# Patient Record
Sex: FEMALE | Race: WHITE | Employment: FULL TIME | URBAN - METROPOLITAN AREA
[De-identification: names, ages, dates, MRNs, and addresses within clinical notes are randomized per-mention and may not be internally consistent; named-entity substitution may affect disease eponyms.]

---

## 2022-02-02 ENCOUNTER — OFFICE VISIT (OUTPATIENT)
Dept: ENDOCRINOLOGY | Age: 57
End: 2022-02-02
Payer: COMMERCIAL

## 2022-02-02 VITALS
WEIGHT: 138 LBS | HEIGHT: 67 IN | SYSTOLIC BLOOD PRESSURE: 107 MMHG | BODY MASS INDEX: 21.66 KG/M2 | DIASTOLIC BLOOD PRESSURE: 66 MMHG | HEART RATE: 102 BPM

## 2022-02-02 DIAGNOSIS — E03.9 ACQUIRED HYPOTHYROIDISM: Primary | ICD-10-CM

## 2022-02-02 DIAGNOSIS — R73.03 PRE-DIABETES: ICD-10-CM

## 2022-02-02 PROCEDURE — 99214 OFFICE O/P EST MOD 30 MIN: CPT | Performed by: INTERNAL MEDICINE

## 2022-02-02 RX ORDER — CEPHALEXIN 500 MG/1
500 CAPSULE ORAL 3 TIMES DAILY
COMMUNITY
Start: 2022-01-01 | End: 2022-02-02 | Stop reason: ALTCHOICE

## 2022-02-02 RX ORDER — LISDEXAMFETAMINE DIMESYLATE 50 MG/1
CAPSULE ORAL
COMMUNITY
Start: 2022-02-01

## 2022-02-02 RX ORDER — LEVOTHYROXINE SODIUM 100 UG/1
TABLET ORAL
COMMUNITY
Start: 2021-12-23

## 2022-02-02 RX ORDER — DEXTROAMPHETAMINE SACCHARATE, AMPHETAMINE ASPARTATE, DEXTROAMPHETAMINE SULFATE AND AMPHETAMINE SULFATE 1.25; 1.25; 1.25; 1.25 MG/1; MG/1; MG/1; MG/1
TABLET ORAL AS NEEDED
COMMUNITY
Start: 2021-11-19 | End: 2022-03-28

## 2022-02-02 RX ORDER — SITAGLIPTIN AND METFORMIN HYDROCHLORIDE 50; 1000 MG/1; MG/1
TABLET, FILM COATED, EXTENDED RELEASE ORAL
COMMUNITY
Start: 2021-12-18

## 2022-02-02 RX ORDER — MONTELUKAST SODIUM 10 MG/1
TABLET ORAL
COMMUNITY
Start: 2022-02-01 | End: 2022-03-28

## 2022-02-02 RX ORDER — BUPROPION HYDROCHLORIDE 150 MG/1
TABLET ORAL
COMMUNITY
Start: 2022-02-01 | End: 2022-02-02 | Stop reason: ALTCHOICE

## 2022-02-02 RX ORDER — LISDEXAMFETAMINE DIMESYLATE 30 MG/1
CAPSULE ORAL
COMMUNITY
Start: 2021-12-15 | End: 2022-02-02 | Stop reason: ALTCHOICE

## 2022-02-02 RX ORDER — BUPROPION HYDROCHLORIDE 300 MG/1
300 TABLET ORAL DAILY
COMMUNITY
Start: 2022-01-08

## 2022-02-02 RX ORDER — MELOXICAM 15 MG/1
TABLET ORAL
COMMUNITY
Start: 2022-01-05 | End: 2022-03-28

## 2022-02-02 NOTE — PROGRESS NOTES
Chief Complaint   Patient presents with    Diabetes       * New Patient Visit - last seen by me at my old practice 5/21    General:  Spent last summer in [address] [place] [address],[address]- dad suffering from dementia  [employer] is getting very stressful - to change jobs or retiring  Needing Violeta Lyon - psych  -also deplin  Keeps falling down stairs - 5 times in 4 years--- before fall has sense of dizziness. Feels like narcolepsy; has overwhelming need to sleep at times; last time early Jan and ankle still swollen  To see Neuro in a few weeks - high incidence of alzheimer -- wants a baseline    1. Thyroid - on 100mcg, on brand, takes correctly  (she looked into T3 as an option awhile back but decided against it)    2. Pre-Diabetes - a1c 5.7 so got GTT and had lows. On janumet XR (stomach issues if 2 at once) now b/c ER version of metformin in it and tolerable  Does not check sugars - can feel is sugars go up    3.  Osteopenia - followed by PCP - 2nd work up was negative - likely genetic    Thyroid Symptoms  denies change in energy, denies change in weight, denies change in appetite, denies sleep issues, denies hair changes, no skin changes, denies sweats, denies hot/cold intolerance, denies tremor, denies palpitations, denies change in bowels, no change in menses, denies mood changes    Neck Symptoms  denies dysphagia, denies anterior neck pain, denies neck swelling, notes no nodules    Labs/Studies  4/24/19 a1c 5.4, TSH 1.5, Vit D 54  10/29/19 a1c 5.2, TSh 1.4, Vit D 45  5/1/20 celiac neg, SPEP/UPEP nl, Ca 10.0, PTH 17, Phos 3.2, Untx 19, a1c 5.0, Camacho 6.9, TSH 2.0, U Ca 10.1, U Cr 36, Vit D 38   12/9/20 a1c 5.2, TSH 3.1 (dose increased 88 to 100mcg)  5/3/21 a1c 5.1, TSH 1.7, Vit D 56  12/15/21 CMP wnl, CBC wnl, TSH 1.3, FT4 1.3, Vit D 42, a1c 5.1, chol 201/50/97/94    Past Medical History:   Diagnosis Date    B12 deficiency     Hypothyroidism     Lichen sclerosus of vulva     Nausea & vomiting     if placed on narcotics    Pre-diabetes     Ventricular septal defect     patent foramen ovale    Ventricular septal defect        Social History     Tobacco Use    Smoking status: Never Smoker    Smokeless tobacco: Never Used   Substance Use Topics    Alcohol use: No        Professor at [employer]    Blood pressure 107/66, pulse (!) 102, height 5' 7\" (1.702 m), weight 138 lb (62.6 kg), last menstrual period 03/20/2014. Weight Metrics 2/2/2022 6/10/2015 3/21/2014 5/6/2013 1/21/2013 12/20/2011 5/24/2011   Weight 138 lb 145 lb 148 lb 148 lb 147 lb 147 lb 9.6 oz 145 lb   BMI 21.61 kg/m2 22.71 kg/m2 23.17 kg/m2 23.17 kg/m2 23.02 kg/m2 23.11 kg/m2 22.71 kg/m2        EXAM:  - GENERAL: NCAT, Appears well nourished   - EYES: EOMI, non-icteric, no proptosis   - Ear/Nose/Throat: NCAT, no visible inflammation or masses   - CARDIOVASCULAR: no cyanosis, no visible JVD   - RESPIRATORY: respiratory effort normal without any distress or labored breathing   - MUSCULOSKELETAL: Normal ROM of neck and upper extremities observed   - SKIN: No rash on face  - NEUROLOGIC:  No facial asymmetry (Cranial nerve 7 motor function), No gaze palsy   - PSYCHIATRIC: Normal affect, Normal insight and judgement    Assessment/Plan:   1. Acquired hypothyroidism  stable on 100mcg- on brand, takes correctly    2. Pre-diabetes  Stable on janumet - uses this b/c can only tolerate branded metformin       Follow-up and Dispositions    · Return in about 1 year (around 2/2/2023).

## 2022-02-02 NOTE — LETTER
2/2/2022    Patient: Susanne Ball   YOB: 1965   Date of Visit: 2/2/2022     Suzanne Black MD  [facility]  [address] [facility] [address]  Via Fax: [phone]    Dear Suzanne Black MD,      Thank you for referring Ms. Cleopatra Garrison to [facility] for evaluation. My notes for this consultation are attached. If you have questions, please do not hesitate to call me. I look forward to following your patient along with you.       Sincerely,    Giacomo Ruiz MD

## 2022-03-28 ENCOUNTER — OFFICE VISIT (OUTPATIENT)
Dept: NEUROLOGY | Age: 57
End: 2022-03-28
Payer: COMMERCIAL

## 2022-03-28 VITALS
OXYGEN SATURATION: 99 % | HEART RATE: 82 BPM | SYSTOLIC BLOOD PRESSURE: 118 MMHG | BODY MASS INDEX: 22.02 KG/M2 | WEIGHT: 137 LBS | HEIGHT: 66 IN | DIASTOLIC BLOOD PRESSURE: 74 MMHG

## 2022-03-28 DIAGNOSIS — E53.8 LOW VITAMIN B12 LEVEL: ICD-10-CM

## 2022-03-28 DIAGNOSIS — R41.3 MEMORY LOSS: Primary | ICD-10-CM

## 2022-03-28 PROCEDURE — 99203 OFFICE O/P NEW LOW 30 MIN: CPT | Performed by: PSYCHIATRY & NEUROLOGY

## 2022-03-28 RX ORDER — BACLOFEN 10 MG/1
TABLET ORAL
COMMUNITY
Start: 2022-02-03 | End: 2022-03-28

## 2022-03-28 RX ORDER — HYDROCODONE BITARTRATE AND ACETAMINOPHEN 5; 325 MG/1; MG/1
TABLET ORAL
COMMUNITY
Start: 2022-02-16 | End: 2022-03-28

## 2022-03-28 NOTE — PROGRESS NOTES
Chief Complaint   Patient presents with    Neurologic Problem     memory loss       Referred by: DR Fredi Medellin      HPI    57-year-old woman, , here to discuss various symptoms. Main concern is that she is noticing in the past years and getting worse difficulty with word finding. Sometimes words escape her. She struggles with anxiety long-term. She sees mental health. She is on Wellbutrin and Vyvanse for over a year. She has a very high stress job and is considering long term. She has chronic poor sleep in general.  No smoking or alcohol. Exercises. Various first-degree family members who have a history of dementia-like condition. ADLs intact. Not getting lost.      Review of Systems   Psychiatric/Behavioral: Positive for memory loss. The patient is nervous/anxious and has insomnia. All other systems reviewed and are negative.       Past Medical History:   Diagnosis Date    B12 deficiency     Hypothyroidism     Lichen sclerosus of vulva     Nausea & vomiting     if placed on narcotics    Pre-diabetes     Ventricular septal defect     patent foramen ovale    Ventricular septal defect      Family History   Problem Relation Age of Onset    Thyroid Disease Mother     Other Sister         LUPUS-NOT SYSTEMIC    Anesth Problems Neg Hx      Social History     Socioeconomic History    Marital status:      Spouse name: Not on file    Number of children: Not on file    Years of education: Not on file    Highest education level: Not on file   Occupational History    Not on file   Tobacco Use    Smoking status: Never Smoker    Smokeless tobacco: Never Used   Substance and Sexual Activity    Alcohol use: No    Drug use: No    Sexual activity: Yes     Partners: Male   Other Topics Concern    Not on file   Social History Narrative    Not on file     Social Determinants of Health     Financial Resource Strain:     Difficulty of Paying Living Expenses: Not on file Food Insecurity:     Worried About Running Out of Food in the Last Year: Not on file    Iraj of Food in the Last Year: Not on file   Transportation Needs:     Lack of Transportation (Medical): Not on file    Lack of Transportation (Non-Medical): Not on file   Physical Activity:     Days of Exercise per Week: Not on file    Minutes of Exercise per Session: Not on file   Stress:     Feeling of Stress : Not on file   Social Connections:     Frequency of Communication with Friends and Family: Not on file    Frequency of Social Gatherings with Friends and Family: Not on file    Attends Baptism Services: Not on file    Active Member of [address] [place] [address] [employer] or Organizations: Not on file    Attends Club or Organization Meetings: Not on file    Marital Status: Not on file   Intimate Partner Violence:     Fear of Current or Ex-Partner: Not on file    Emotionally Abused: Not on file    Physically Abused: Not on file    Sexually Abused: Not on file   Housing Stability:     Unable to Pay for Housing in the Last Year: Not on file    Number of [place] in the Last Year: Not on file    Unstable Housing in the Last Year: Not on file     Current Outpatient Medications   Medication Sig    Vyvanse 50 mg cap     Janumet XR 50-1,000 mg TM24 TAKE 2 TABLETS BY MOUTH WITH EVENING MEAL ONCE A DAY    Unithroid 100 mcg tablet TAKE 1 TABLET BY MOUTH EVERY MORNING ON AN EMPTY STOMACH    buPROPion XL (WELLBUTRIN XL) 300 mg XL tablet Take 300 mg by mouth daily.  Cholecalciferol, Vitamin D3, (VITAMIN D3) 1,000 unit cap Take 2,000 Units by mouth daily. No current facility-administered medications for this visit. Allergies   Allergen Reactions    Latex Itching     Very sensitive- would rather not have latex used with her surgery. Neurologic Exam     Mental Status   Oriented to person, place, and time. Cranial Nerves   Cranial nerves II through XII intact.      Motor Exam   Muscle bulk: normal    Strength Strength 5/5 throughout. Sensory Exam   Light touch normal.     Gait, Coordination, and Reflexes     Gait  Gait: normal    Physical Exam  Vitals and nursing note reviewed. Constitutional:       Appearance: Normal appearance. She is normal weight. Cardiovascular:      Rate and Rhythm: Normal rate. Pulmonary:      Effort: Pulmonary effort is normal.   Neurological:      Mental Status: She is alert and oriented to person, place, and time. Gait: Gait is intact. Deep Tendon Reflexes: Strength normal.       Visit Vitals  /74 (BP 1 Location: Left upper arm, BP Patient Position: Sitting)   Pulse 82   Ht 5' 6\" (1.676 m)   Wt 137 lb (62.1 kg)   LMP 03/20/2014   SpO2 99%   BMI 22.11 kg/m²       Lab Results   Component Value Date/Time    WBC 5.1 05/06/2013 09:18 AM    HGB 11.5 05/06/2013 09:18 AM    HCT 36.1 05/06/2013 09:18 AM    PLATELET 208 38/56/1194 09:18 AM    MCV 93 05/06/2013 09:18 AM     Lab Results   Component Value Date/Time    Glucose 81 12/20/2011 04:26 PM    Creatinine 0.87 12/20/2011 04:26 PM      No results found for: CHOL, CHOLPOCT, HDL, LDL, LDLC, LDLCPOC, LDLCEXT, TRIGL, TGLPOCT, CHHD, CHHDX     CT Results (maximum last 3): No results found for this or any previous visit. MRI Results (maximum last 3): No results found for this or any previous visit. PET Results (maximum last 3): No results found for this or any previous visit. Assessment and Plan   Diagnoses and all orders for this visit:    1. Memory loss  -     VITAMIN B12; Future  -     MRI BRAIN WO CONT; Future  -     REFERRAL TO PSYCHOLOGY    2. Low vitamin B12 level  -     VITAMIN B12; Future      78-year-old woman who presents with short-term memory loss, word finding symptoms. I suspect a significant attention/concentration issue. It sounds like she has underlying attention deficit. She does have a significant family history for dementia. I am going to check baseline neuroimaging.   Baseline neuropsychological testing. I think she needs to discuss with mental health possibly some medication changes. B12 several years ago trending low some going to repeat that as well. Continue with exercise and self-care and stress management. I would like to see her after her testing is done. 40 minutes of time was taken in total today reviewing the medical record, external records on paper, face-to-face time, and time completing documentation today. I reviewed and decided to continue the current medications. This clinical note was dictated with an electronic dictation software that can make unintentional errors. If there are any questions, please contact me directly for clarification. A notice of this visit/encounter being completed has been sent electronically to the patient's PCP and/or referring provider.      [address] [facility], [facility] [address]  Diplomate [employer]

## 2022-03-28 NOTE — PROGRESS NOTES
Chief Complaint   Patient presents with    Neurologic Problem     memory loss     Visit Vitals  /74 (BP 1 Location: Left upper arm, BP Patient Position: Sitting)   Pulse 82   Ht 5' 6\" (1.676 m)   Wt 137 lb (62.1 kg)   SpO2 99%   BMI 22.11 kg/m²

## 2022-03-29 LAB — VIT B12 SERPL-MCNC: 246 PG/ML (ref 232–1245)

## 2022-03-29 NOTE — PROGRESS NOTES
Vitamin B12 is still on the low end. I would recommend a temporary course of injection replacement Typically done at the primary care office.

## 2022-03-31 ENCOUNTER — HOSPITAL ENCOUNTER (OUTPATIENT)
Dept: MRI IMAGING | Age: 57
Discharge: HOME | End: 2022-03-31
Attending: PSYCHIATRY & NEUROLOGY
Payer: COMMERCIAL

## 2022-03-31 DIAGNOSIS — R41.3 MEMORY LOSS: ICD-10-CM

## 2022-03-31 PROCEDURE — 70551 MRI BRAIN STEM W/O DYE: CPT

## 2022-03-31 NOTE — PROGRESS NOTES
I looked at the MRI and everything looks good. There is no evidence of stroke or multiple sclerosis. No tumor. No atrophy/shrinkage. The report makes a comment about a single small hyperintensity which is incidental and has nothing to do with her symptoms. This is age-related. We have good baseline imaging.

## 2022-06-10 ENCOUNTER — TRANSCRIBE ORDER (OUTPATIENT)
Dept: SCHEDULING | Age: 57
End: 2022-06-10

## 2022-06-10 DIAGNOSIS — K80.20 GALLBLADDER CALCULUS WITHOUT CHOLECYSTITIS AND NO OBSTRUCTION: Primary | ICD-10-CM

## 2022-06-23 ENCOUNTER — HOSPITAL ENCOUNTER (OUTPATIENT)
Dept: ULTRASOUND IMAGING | Age: 57
Discharge: HOME | End: 2022-06-23
Attending: OBSTETRICS & GYNECOLOGY
Payer: COMMERCIAL

## 2022-06-23 DIAGNOSIS — K80.20 GALLBLADDER CALCULUS WITHOUT CHOLECYSTITIS AND NO OBSTRUCTION: ICD-10-CM

## 2022-06-23 PROCEDURE — 76705 ECHO EXAM OF ABDOMEN: CPT

## 2022-08-09 ENCOUNTER — TELEPHONE (OUTPATIENT)
Dept: ENDOCRINOLOGY | Age: 57
End: 2022-08-09

## 2022-08-09 DIAGNOSIS — E03.9 ACQUIRED HYPOTHYROIDISM: Primary | ICD-10-CM

## 2022-08-10 NOTE — TELEPHONE ENCOUNTER
If we want to recheck her TSH before changing her dose in case it was a lab error, we can  If she did lower to 88mcg (which sounds like she is not) we would recheck 6-8 weeks after the dose change  She can always take the 100mcg 6 days a week, none on day 7 to get 88mcg too  I will put a TSH lab order in just in case    Rosamaria Palumbo MD

## 2022-08-10 NOTE — TELEPHONE ENCOUNTER
Hmm, I looked closely but not closely enough and did not see that visit  She can go to [facility], but not sure how we send it to [facility]? Or do we send it local and somehow it can be transferred?

## 2022-08-10 NOTE — TELEPHONE ENCOUNTER
Pt was notified of Dr Hawkins Party message and she voiced understanding of what was read to hear and she state that a prescription will not be needed at this time. She then stated that she will be returning home August 24 and would like to know whether or not labs will be needed.

## 2022-08-23 ENCOUNTER — TELEPHONE (OUTPATIENT)
Dept: ENDOCRINOLOGY | Age: 57
End: 2022-08-23

## 2022-08-23 NOTE — TELEPHONE ENCOUNTER
8/23/2022  1:58 PM        Pt called today and left voice mail at 12:27 pm.Pt stated she called from [address] [place] [address] about her tsh numbers and got some conflicting advice and was told to come in for test.Pt stated [place] is [place] and would like a call back so she can clarify instructions. FD#[phone]      Thanks,  Michael Bee

## 2022-08-25 NOTE — TELEPHONE ENCOUNTER
I returned this call and relayed the message from Dr. Whitney Osuna. Ms. Nirali Fofana said she has been taking the 100 mcg tab daily.  She said she will get the labs drawn and then we can make adjustments as needed  Sae Baez

## 2022-08-25 NOTE — TELEPHONE ENCOUNTER
She contacted us while in [facility] to say  her TSH was 0.4 and should she lower the dose from 100mcg  I suggested 88mcg, but did not know how to get it to her. Annie Gabriel said she said she did not need an Rx    So I said. ..once back. ...     \"If we want to recheck her TSH before changing her dose in case it was a lab error, we can  If she did lower to 88mcg (which sounds like she is not) we would recheck 6-8 weeks after the dose change  She can always take the 100mcg 6 days a week, none on day 7 to get 88mcg too  I will put a TSH lab order in just in case\"

## 2022-08-25 NOTE — TELEPHONE ENCOUNTER
8/25/2022  12:43 PM     Pt called regarding the message below. Pt can be reached at [phone].     Thanks,   Karin Canavan

## 2022-09-03 LAB — TSH SERPL DL<=0.005 MIU/L-ACNC: 0.97 UIU/ML (ref 0.45–4.5)

## 2023-02-02 ENCOUNTER — OFFICE VISIT (OUTPATIENT)
Dept: ENDOCRINOLOGY | Age: 58
End: 2023-02-02
Payer: COMMERCIAL

## 2023-02-02 VITALS
SYSTOLIC BLOOD PRESSURE: 104 MMHG | WEIGHT: 137 LBS | BODY MASS INDEX: 22.02 KG/M2 | HEART RATE: 101 BPM | DIASTOLIC BLOOD PRESSURE: 67 MMHG | HEIGHT: 66 IN

## 2023-02-02 DIAGNOSIS — R73.03 PRE-DIABETES: ICD-10-CM

## 2023-02-02 DIAGNOSIS — E03.9 ACQUIRED HYPOTHYROIDISM: Primary | ICD-10-CM

## 2023-02-02 PROCEDURE — 99214 OFFICE O/P EST MOD 30 MIN: CPT | Performed by: INTERNAL MEDICINE

## 2023-02-02 RX ORDER — CYANOCOBALAMIN 1000 UG/ML
INJECTION INTRAMUSCULAR; SUBCUTANEOUS EVERY 2 WEEKS
COMMUNITY
Start: 2022-11-13

## 2023-02-02 RX ORDER — BUPROPION HYDROCHLORIDE 150 MG/1
150 TABLET ORAL DAILY
COMMUNITY
Start: 2022-12-12

## 2023-02-02 RX ORDER — SITAGLIPTIN AND METFORMIN HYDROCHLORIDE 50; 1000 MG/1; MG/1
TABLET, FILM COATED, EXTENDED RELEASE ORAL
Qty: 180 TABLET | Refills: 3 | Status: SHIPPED | OUTPATIENT
Start: 2023-02-02

## 2023-02-02 RX ORDER — LEVOTHYROXINE SODIUM 100 UG/1
TABLET ORAL
Qty: 90 TABLET | Refills: 3 | Status: SHIPPED | OUTPATIENT
Start: 2023-02-02

## 2023-02-02 RX ORDER — PROGESTERONE 100 MG/1
CAPSULE ORAL
COMMUNITY

## 2023-02-02 NOTE — PROGRESS NOTES
Chief Complaint   Patient presents with    Thyroid Problem    Blood sugar problem       * Since Last Visit: -2/2/2022     General:  In [place] mo (dad dementia)- thinks got generic which caused abnormal levels  Then last Rx was generic too - from PCP     [employer] going ok right now  Saw Neuro - low Vit B12 - so getting shots now   Baseline cognitive test this week - Fhx memory loss    Feet flaky skin between toes - likely fungal    1. Thyroid - on 100mcg, on brand (generic this time) takes correctly  (she looked into T3 as an option awhile back but decided against it)    2. Pre-Diabetes - a1c 5.7 so got GTT and had lows. On janumet XR (stomach issues if 2 at once) now b/c ER version of metformin in it and tolerable  Does not check sugars - can feel is sugars go up    3.  Osteopenia - followed by PCP - 2nd work up was negative - likely genetic    Thyroid Symptoms  denies change in energy, denies change in weight, denies change in appetite, denies sleep issues, denies hair changes, no skin changes, denies sweats, denies hot/cold intolerance, denies tremor, denies palpitations, denies change in bowels, no change in menses, denies mood changes    Neck Symptoms  denies dysphagia, denies anterior neck pain, denies neck swelling, notes no nodules    Labs/Studies  4/24/19 a1c 5.4, TSH 1.5, Vit D 54  10/29/19 a1c 5.2, TSh 1.4, Vit D 45  5/1/20 celiac neg, SPEP/UPEP nl, Ca 10.0, PTH 17, Phos 3.2, Untx 19, a1c 5.0, Camacho 6.9, TSH 2.0, U Ca 10.1, U Cr 36, Vit D 38   12/9/20 a1c 5.2, TSH 3.1 (dose increased 88 to 100mcg)  5/3/21 a1c 5.1, TSH 1.7, Vit D 56  12/15/21 CMP wnl, CBC wnl, TSH 1.3, FT4 1.3, Vit D 42, a1c 5.1, chol 201/50/97/94    LABS:  Lab Results   Component Value Date/Time    TSH 0.969 09/02/2022 01:44 PM      Lab Results   Component Value Date/Time    Hemoglobin A1c, External 5.1 12/15/2021 12:00 AM    Hemoglobin A1c, External 5.1 05/04/2021 12:00 AM    Hemoglobin A1c, External 5.2 12/10/2020 12:00 AM    Glucose 81 12/20/2011 04:26 PM    GFR est AA 91 12/20/2011 04:26 PM    GFR est non-AA 79 12/20/2011 04:26 PM    Creatinine 0.87 12/20/2011 04:26 PM        Past Medical History:   Diagnosis Date    B12 deficiency     Hypothyroidism     Lichen sclerosus of vulva     Nausea & vomiting     if placed on narcotics    Pre-diabetes     Ventricular septal defect     patent foramen ovale    Ventricular septal defect           Professor at [employer]    Blood pressure 104/67, pulse (!) 101, height 5' 6\" (1.676 m), weight 137 lb (62.1 kg), last menstrual period 03/20/2014. Weight Metrics 2/2/2023 3/28/2022 2/2/2022 6/10/2015 3/21/2014 5/6/2013 1/21/2013   Weight 137 lb 137 lb 138 lb 145 lb 148 lb 148 lb 147 lb   BMI 22.11 kg/m2 22.11 kg/m2 21.61 kg/m2 22.71 kg/m2 23.17 kg/m2 23.17 kg/m2 23.02 kg/m2        EXAM:  - not done today     Assessment/Plan:   1. Acquired hypothyroidism  stable on 100mcg- on brand/generic (prefers brand), takes correctly  Check current status and adjust prn     2. Pre-diabetes  Stable on janumet - uses this b/c can only tolerate branded metformin     Orders Placed This Encounter    HEMOGLOBIN A1C WITH EAG    TSH 3RD GENERATION    HEMOGLOBIN A1C WITH EAG     Standing Status:   Future     Standing Expiration Date:   8/2/2024    TSH 3RD GENERATION     Standing Status:   Future     Standing Expiration Date:   8/2/2024    Janumet XR 50-1,000 mg TM24     Sig: TAKE 2 TABLETS BY MOUTH WITH EVENING MEAL ONCE A DAY     Dispense:  180 Tablet     Refill:  3    Unithroid 100 mcg tablet     Sig: TAKE 1 TABLET BY MOUTH EVERY MORNING ON AN EMPTY STOMACH     Dispense:  90 Tablet     Refill:  3     BRAND NAME MEDICALLY NECESSARY            Follow-up and Dispositions    Return in about 1 year (around 2/2/2024).

## 2023-02-02 NOTE — LETTER
2/2/2023    Patient: Angelo Toledo   YOB: 1965   Date of Visit: 2/2/2023     Diamante Medina MD  [place] [id number]  Via Fax: [phone]    Dear Diamante Medina MD,      Thank you for referring Ms. Cleopatra Garrison to [facility] for evaluation. My notes for this consultation are attached. If you have questions, please do not hesitate to call me. I look forward to following your patient along with you.       Sincerely,    Ninoska Chong MD

## 2023-02-03 LAB
EST. AVERAGE GLUCOSE BLD GHB EST-MCNC: 108 MG/DL
HBA1C MFR BLD: 5.4 % (ref 4.8–5.6)
TSH SERPL DL<=0.005 MIU/L-ACNC: 1.09 UIU/ML (ref 0.45–4.5)

## 2023-04-22 DIAGNOSIS — E03.9 ACQUIRED HYPOTHYROIDISM: Primary | ICD-10-CM

## 2023-04-22 DIAGNOSIS — R73.03 PRE-DIABETES: Primary | ICD-10-CM

## 2024-01-02 DIAGNOSIS — R73.03 PRE-DIABETES: ICD-10-CM

## 2024-01-02 DIAGNOSIS — E03.9 ACQUIRED HYPOTHYROIDISM: ICD-10-CM

## 2024-02-01 NOTE — PROGRESS NOTES
patent foramen ovale    Ventricular septal defect            Professor at [employer]    Blood pressure 109/62, pulse 82, height 1.676 m (5' 6\"), weight 60.3 kg (133 lb).        2/2/2024    10:39 AM 2/2/2023     8:41 AM 3/28/2022     8:57 AM 2/2/2022    11:21 AM   Weight Metrics   Weight 133 lb 137 lb 137 lb 138 lb   BMI (Calculated) 21.5 kg/m2 22.2 kg/m2 22.2 kg/m2 21.7 kg/m2        EXAM:  - not done today     Assessment/Plan:   1. Acquired hypothyroidism  stable on 100mcg- on brand, takes correctly  Check current status and adjust prn     2. Pre-diabetes  Stable on janumet - uses this b/c can only tolerate branded metformin  Check A1c  Weight is stable    Orders Placed This Encounter   Procedures    Hemoglobin A1C    TSH     LAB CAROL [id number] -3rd Generation TSH    Basic Metabolic Panel    Hemoglobin A1C     Standing Status:   Future     Standing Expiration Date:   8/2/2025    TSH     LAB CAROL [id number] -3rd Generation TSH     Standing Status:   Future     Standing Expiration Date:   8/2/2025      No orders of the defined types were placed in this encounter.       Return in about 1 year (around 2/2/2025).

## 2024-02-02 ENCOUNTER — OFFICE VISIT (OUTPATIENT)
Age: 59
End: 2024-02-02
Payer: COMMERCIAL

## 2024-02-02 VITALS
HEIGHT: 66 IN | HEART RATE: 82 BPM | BODY MASS INDEX: 21.38 KG/M2 | DIASTOLIC BLOOD PRESSURE: 62 MMHG | SYSTOLIC BLOOD PRESSURE: 109 MMHG | WEIGHT: 133 LBS

## 2024-02-02 DIAGNOSIS — E03.9 ACQUIRED HYPOTHYROIDISM: Primary | ICD-10-CM

## 2024-02-02 DIAGNOSIS — R73.03 PREDIABETES: ICD-10-CM

## 2024-02-02 PROCEDURE — 99204 OFFICE O/P NEW MOD 45 MIN: CPT | Performed by: INTERNAL MEDICINE

## 2024-02-02 RX ORDER — OMEGA-3/DHA/EPA/FISH OIL 35-113.5MG
1000 TABLET,CHEWABLE ORAL 2 TIMES DAILY
COMMUNITY
Start: 2023-11-25 | End: 2024-02-23

## 2024-02-02 RX ORDER — SYRINGE WITH NEEDLE, 1 ML 25GX5/8"
SYRINGE, EMPTY DISPOSABLE MISCELLANEOUS
COMMUNITY
Start: 2023-11-14

## 2024-02-02 RX ORDER — LEVOTHYROXINE SODIUM 100 UG/1
100 TABLET ORAL DAILY
COMMUNITY
End: 2024-02-04 | Stop reason: SDUPTHER

## 2024-02-02 RX ORDER — NEEDLES, SAFETY 22GX1 1/2"
NEEDLE, DISPOSABLE MISCELLANEOUS
COMMUNITY
Start: 2023-11-16

## 2024-02-03 LAB
BUN SERPL-MCNC: 16 MG/DL (ref 8–27)
BUN/CREAT SERPL: 17 (ref 12–28)
CALCIUM SERPL-MCNC: 9.3 MG/DL (ref 8.7–10.3)
CHLORIDE SERPL-SCNC: 101 MMOL/L (ref 96–106)
CO2 SERPL-SCNC: 25 MMOL/L (ref 20–29)
CREAT SERPL-MCNC: 0.93 MG/DL (ref 0.57–1)
EGFRCR SERPLBLD CKD-EPI 2021: 70 ML/MIN/1.73
GLUCOSE SERPL-MCNC: 81 MG/DL (ref 70–99)
HBA1C MFR BLD: 5.4 % (ref 4.8–5.6)
POTASSIUM SERPL-SCNC: 4.1 MMOL/L (ref 3.5–5.2)
SODIUM SERPL-SCNC: 139 MMOL/L (ref 134–144)
TSH SERPL DL<=0.005 MIU/L-ACNC: 0.85 UIU/ML (ref 0.45–4.5)

## 2024-02-04 RX ORDER — LEVOTHYROXINE SODIUM 100 UG/1
100 TABLET ORAL DAILY
Qty: 90 TABLET | Refills: 3 | Status: SHIPPED | OUTPATIENT
Start: 2024-02-04

## 2024-05-01 RX ORDER — SITAGLIPTIN AND METFORMIN HYDROCHLORIDE 1000; 50 MG/1; MG/1
TABLET, FILM COATED, EXTENDED RELEASE ORAL
Qty: 180 TABLET | Refills: 3 | Status: SHIPPED | OUTPATIENT
Start: 2024-05-01

## 2025-01-02 DIAGNOSIS — R73.03 PREDIABETES: ICD-10-CM

## 2025-01-02 DIAGNOSIS — E03.9 ACQUIRED HYPOTHYROIDISM: ICD-10-CM

## 2025-01-22 ENCOUNTER — OFFICE VISIT (OUTPATIENT)
Age: 60
End: 2025-01-22
Payer: COMMERCIAL

## 2025-01-22 VITALS — WEIGHT: 130 LBS | HEIGHT: 66 IN | BODY MASS INDEX: 20.89 KG/M2

## 2025-01-22 DIAGNOSIS — R73.03 PREDIABETES: Primary | ICD-10-CM

## 2025-01-22 DIAGNOSIS — E03.9 ACQUIRED HYPOTHYROIDISM: ICD-10-CM

## 2025-01-22 PROCEDURE — 99214 OFFICE O/P EST MOD 30 MIN: CPT | Performed by: INTERNAL MEDICINE

## 2025-01-22 PROCEDURE — G2211 COMPLEX E/M VISIT ADD ON: HCPCS | Performed by: INTERNAL MEDICINE

## 2025-01-22 RX ORDER — FLUTICASONE PROPIONATE 50 MCG
1 SPRAY, SUSPENSION (ML) NASAL DAILY
COMMUNITY

## 2025-01-22 RX ORDER — FOLIC ACID 1 MG/1
1 TABLET ORAL DAILY
COMMUNITY
Start: 2025-01-20 | End: 2026-01-20

## 2025-01-22 RX ORDER — METHOTREXATE 2.5 MG/1
15 TABLET ORAL WEEKLY
COMMUNITY
Start: 2025-01-20 | End: 2025-05-20

## 2025-01-22 RX ORDER — DEXTROAMPHETAMINE SACCHARATE, AMPHETAMINE ASPARTATE, DEXTROAMPHETAMINE SULFATE AND AMPHETAMINE SULFATE 1.25; 1.25; 1.25; 1.25 MG/1; MG/1; MG/1; MG/1
TABLET ORAL
COMMUNITY
Start: 2024-11-07

## 2025-01-22 RX ORDER — SITAGLIPTIN AND METFORMIN HYDROCHLORIDE 1000; 50 MG/1; MG/1
TABLET, FILM COATED, EXTENDED RELEASE ORAL
Qty: 180 TABLET | Refills: 3 | Status: SHIPPED | OUTPATIENT
Start: 2025-01-22

## 2025-01-22 RX ORDER — ESTRADIOL 2 MG/1
SYSTEM VAGINAL
COMMUNITY
End: 2025-01-22

## 2025-01-22 RX ORDER — LEVOTHYROXINE SODIUM 100 UG/1
100 TABLET ORAL DAILY
Qty: 90 TABLET | Refills: 3 | Status: SHIPPED | OUTPATIENT
Start: 2025-01-22

## 2025-01-22 NOTE — PROGRESS NOTES
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 Thyroid - on 100mcg, on brand, takes correctly  (she looked into T3 as an option awhile back but decided against it)    2. Pre-Diabetes - a1c 5.7 so got GTT and had lows.  On janumet XR (stomach issues if 2 at once) now b/c ER version of metformin in it and tolerable  Does not check sugars - can feel if sugars go up    3. Osteopenia - followed by PCP - 2nd work up was negative - likely genetic    Thyroid Symptoms  denies change in energy, denies change in weight, denies change in appetite, denies sleep issues, denies hair changes, no skin changes, denies sweats, denies hot/cold intolerance, denies tremor, denies palpitations, denies change in bowels, no change in menses, denies mood changes    Neck Symptoms  denies dysphagia, denies anterior neck pain, denies neck swelling, notes no nodules     Labs/Studies  4/24/19 a1c 5.4, TSH 1.5, Vit D 54  10/29/19 a1c 5.2, TSh 1.4, Vit D 45  5/1/20 celiac neg, SPEP/UPEP nl, Ca 10.0, PTH 17, Phos 3.2, Untx 19, a1c 5.0, Trevor 6.9, TSH 2.0, U Ca 10.1, U Cr 36, Vit D 38   12/9/20 a1c 5.2, TSH 3.1 (dose increased 88 to 100mcg)  5/3/21 a1c 5.1, TSH 1.7, Vit D 56  12/15/21 CMP wnl, CBC wnl, TSH 1.3, FT4 1.3, Vit D 42, a1c 5.1, chol 201/50/97/94  11/7/24 - TSH 0.85, A1c 5.3     LABS:  Lab Results   Component Value Date/Time    TSH 0.847 02/02/2024 11:54 AM    TSH 1.090 02/02/2023 09:46 AM    TSH 0.969 09/02/2022 01:44 PM     Lab Results   Component Value Date/Time    LABA1C 5.4 02/02/2024 11:54 AM    LABA1C 5.4 02/02/2023